# Patient Record
Sex: MALE | Race: WHITE | NOT HISPANIC OR LATINO
[De-identification: names, ages, dates, MRNs, and addresses within clinical notes are randomized per-mention and may not be internally consistent; named-entity substitution may affect disease eponyms.]

---

## 2021-11-19 PROBLEM — Z00.00 ENCOUNTER FOR PREVENTIVE HEALTH EXAMINATION: Status: ACTIVE | Noted: 2021-11-19

## 2021-11-22 PROBLEM — Z00.00 ENCOUNTER FOR PREVENTIVE HEALTH EXAMINATION: Noted: 2021-11-22

## 2021-11-29 ENCOUNTER — APPOINTMENT (OUTPATIENT)
Dept: PULMONOLOGY | Facility: CLINIC | Age: 76
End: 2021-11-29
Payer: MEDICARE

## 2021-11-29 VITALS
HEART RATE: 78 BPM | RESPIRATION RATE: 16 BRPM | OXYGEN SATURATION: 96 % | SYSTOLIC BLOOD PRESSURE: 136 MMHG | DIASTOLIC BLOOD PRESSURE: 74 MMHG | WEIGHT: 220 LBS

## 2021-11-29 DIAGNOSIS — Z87.891 PERSONAL HISTORY OF NICOTINE DEPENDENCE: ICD-10-CM

## 2021-11-29 DIAGNOSIS — R09.82 POSTNASAL DRIP: ICD-10-CM

## 2021-11-29 PROCEDURE — 99204 OFFICE O/P NEW MOD 45 MIN: CPT

## 2021-11-29 RX ORDER — DOXYCYCLINE HYCLATE 100 MG/1
100 CAPSULE ORAL
Qty: 14 | Refills: 0 | Status: DISCONTINUED | COMMUNITY
Start: 2021-10-04 | End: 2021-11-29

## 2021-11-29 RX ORDER — AZELASTINE HYDROCHLORIDE 137 UG/1
0.1 SPRAY, METERED NASAL
Qty: 30 | Refills: 0 | Status: ACTIVE | COMMUNITY
Start: 2021-10-07

## 2021-11-29 RX ORDER — FLUTICASONE PROPIONATE 50 UG/1
50 SPRAY, METERED NASAL TWICE DAILY
Qty: 1 | Refills: 6 | Status: ACTIVE | COMMUNITY
Start: 2021-11-29 | End: 1900-01-01

## 2021-11-29 RX ORDER — ALBUTEROL SULFATE 90 UG/1
108 (90 BASE) INHALANT RESPIRATORY (INHALATION)
Qty: 18 | Refills: 0 | Status: ACTIVE | COMMUNITY
Start: 2021-10-07
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
## 2021-12-01 ENCOUNTER — APPOINTMENT (OUTPATIENT)
Dept: CARDIOLOGY | Facility: CLINIC | Age: 76
End: 2021-12-01
Payer: MEDICARE

## 2021-12-01 ENCOUNTER — NON-APPOINTMENT (OUTPATIENT)
Age: 76
End: 2021-12-01

## 2021-12-01 VITALS
WEIGHT: 217 LBS | RESPIRATION RATE: 16 BRPM | HEART RATE: 60 BPM | BODY MASS INDEX: 32.89 KG/M2 | HEIGHT: 68 IN | SYSTOLIC BLOOD PRESSURE: 126 MMHG | OXYGEN SATURATION: 97 % | DIASTOLIC BLOOD PRESSURE: 70 MMHG

## 2021-12-01 DIAGNOSIS — K80.20 CALCULUS OF GALLBLADDER W/OUT CHOLECYSTITIS W/OUT OBSTRUCTION: ICD-10-CM

## 2021-12-01 DIAGNOSIS — Z01.811 ENCOUNTER FOR PREPROCEDURAL RESPIRATORY EXAMINATION: ICD-10-CM
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
## 2021-12-01 DIAGNOSIS — Z82.3 FAMILY HISTORY OF STROKE: ICD-10-CM

## 2021-12-01 DIAGNOSIS — Z23 ENCOUNTER FOR IMMUNIZATION: ICD-10-CM

## 2021-12-01 DIAGNOSIS — K46.9 UNSPECIFIED ABDOMINAL HERNIA W/OUT OBSTRUCTION OR GANGRENE: ICD-10-CM

## 2021-12-01 PROCEDURE — 93000 ELECTROCARDIOGRAM COMPLETE: CPT

## 2021-12-01 PROCEDURE — 99204 OFFICE O/P NEW MOD 45 MIN: CPT

## 2021-12-01 RX ORDER — FINASTERIDE 5 MG/1
5 TABLET, FILM COATED ORAL DAILY
Qty: 90 | Refills: 2 | Status: ACTIVE | COMMUNITY
Start: 2021-10-08

## 2021-12-01 RX ORDER — TRIAMCINOLONE ACETONIDE 1 MG/G
0.1 OINTMENT TOPICAL
Qty: 80 | Refills: 0 | Status: DISCONTINUED | COMMUNITY
Start: 2021-06-23 | End: 2021-12-01

## 2021-12-01 NOTE — DISCUSSION/SUMMARY
[FreeTextEntry1] : 1.  Check echocardiogram and nuclear stress test to evaluate his chest pain, shortness of breath on exertion and abnormal EKG.\par 2.  No additional cardiac medications at this time.  Will strongly consider treatment for hypertension at follow-up.\par 3.  Monitor BP at home, keep a log and bring to f/u.\par 4.  We will obtain the results of his recent vascular work-up.\par 5.  We will obtain his most recent blood work from his PCP.\par 6.  Follow-up with pulmonary.\par 7.  Follow-up with surgery.\par 8.  Follow up here after testing, and will make further recommendations at that time.

## 2021-12-01 NOTE — HISTORY OF PRESENT ILLNESS
[FreeTextEntry1] : Patient presents to the office today for evaluation because recently is been having primarily issues with chest pain and shortness of breath.  He says about a month and a half ago he started having issues with wheezing at night and a runny nose.  He saw ENT and was told he had postnasal drip and was started on nasal spray.  He also follow-up with pulmonary who is continuing the nasal spray and planning PFTs.  Over the last few weeks he has been having issues also with chest pain and shortness of breath and some dyspnea on exertion.  He is not very specific as to the chest discomfort but says it is on the left side of his chest occurring with and without exertion.  It is associated with the shortness of breath.  He feels as though the symptoms are slowly improving at this point as he has been on the nasal spray.  He is still having some wheezing at night.  He also had been having a little bit of edema and saw a vascular doctor who did a work-up but he does not know the results.  When all of this started he was seen in urgent care and then the emergency room where he had a CAT scan which showed no evidence of PE or any intrathoracic process.  He was found to have a porcelain gallbladder and there is consideration for surgery for that in the near future.  Patient denies palpitations, orthopnea, presyncope, syncope.
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
## 2021-12-14 ENCOUNTER — APPOINTMENT (OUTPATIENT)
Dept: PULMONOLOGY | Facility: CLINIC | Age: 76
End: 2021-12-14

## 2021-12-15 ENCOUNTER — APPOINTMENT (OUTPATIENT)
Dept: CARDIOLOGY | Facility: CLINIC | Age: 76
End: 2021-12-15
Payer: MEDICARE

## 2021-12-15 ENCOUNTER — TRANSCRIPTION ENCOUNTER (OUTPATIENT)
Age: 76
End: 2021-12-15

## 2021-12-15 PROCEDURE — 93306 TTE W/DOPPLER COMPLETE: CPT

## 2022-01-03 ENCOUNTER — APPOINTMENT (OUTPATIENT)
Dept: PULMONOLOGY | Facility: CLINIC | Age: 77
End: 2022-01-03

## 2022-01-21 ENCOUNTER — APPOINTMENT (OUTPATIENT)
Dept: CARDIOLOGY | Facility: CLINIC | Age: 77
End: 2022-01-21

## 2022-01-31 ENCOUNTER — MED ADMIN CHARGE (OUTPATIENT)
Age: 77
End: 2022-01-31

## 2022-01-31 ENCOUNTER — APPOINTMENT (OUTPATIENT)
Dept: CARDIOLOGY | Facility: CLINIC | Age: 77
End: 2022-01-31
Payer: MEDICARE

## 2022-01-31 PROCEDURE — 93015 CV STRESS TEST SUPVJ I&R: CPT

## 2022-01-31 PROCEDURE — A9500: CPT

## 2022-01-31 PROCEDURE — 78452 HT MUSCLE IMAGE SPECT MULT: CPT

## 2022-01-31 RX ADMIN — REGADENOSON 0 MG/5ML: 0.08 INJECTION, SOLUTION INTRAVENOUS at 00:00

## 2022-01-31 RX ADMIN — AMINOPHYLLINE 3 MG/ML: 25 INJECTION, SOLUTION INTRAVENOUS at 00:00

## 2022-02-03 ENCOUNTER — NON-APPOINTMENT (OUTPATIENT)
Age: 77
End: 2022-02-03

## 2022-02-03 ENCOUNTER — APPOINTMENT (OUTPATIENT)
Dept: CARDIOLOGY | Facility: CLINIC | Age: 77
End: 2022-02-03
Payer: MEDICARE

## 2022-02-03 VITALS
BODY MASS INDEX: 33.49 KG/M2 | SYSTOLIC BLOOD PRESSURE: 140 MMHG | WEIGHT: 221 LBS | HEIGHT: 68 IN | RESPIRATION RATE: 14 BRPM | OXYGEN SATURATION: 96 % | DIASTOLIC BLOOD PRESSURE: 82 MMHG | HEART RATE: 67 BPM

## 2022-02-03 DIAGNOSIS — R07.9 CHEST PAIN, UNSPECIFIED: ICD-10-CM

## 2022-02-03 PROCEDURE — 99214 OFFICE O/P EST MOD 30 MIN: CPT

## 2022-02-03 PROCEDURE — 93000 ELECTROCARDIOGRAM COMPLETE: CPT

## 2022-02-03 RX ORDER — AMINOPHYLLINE 25 MG/ML
25 INJECTION, SOLUTION INTRAVENOUS
Qty: 0 | Refills: 0 | Status: COMPLETED | OUTPATIENT
Start: 2022-01-31

## 2022-02-03 RX ORDER — FLUTICASONE FUROATE AND VILANTEROL TRIFENATATE 100; 25 UG/1; UG/1
100-25 POWDER RESPIRATORY (INHALATION) DAILY
Refills: 0 | Status: ACTIVE | COMMUNITY

## 2022-02-03 RX ORDER — REGADENOSON 0.08 MG/ML
0.4 INJECTION, SOLUTION INTRAVENOUS
Qty: 4 | Refills: 0 | Status: COMPLETED | OUTPATIENT
Start: 2022-01-31

## 2022-02-03 NOTE — DISCUSSION/SUMMARY
[FreeTextEntry1] : 1.  No additional cardiac testing at this time.\par 2.  Start telmisartan 20 mg daily for hypertension.  Repeat blood work in 2 weeks.  \par 3.  Monitor BP at home, keep a log and bring to f/u.\par 4.  We will obtain the results of his recent vascular work-up.\par 5.  We will obtain his most recent blood work from his PCP.\par 6.  Follow-up with pulmonary.\par 7.  Follow-up with surgery.\par 8.  Follow up here in three months.

## 2022-02-03 NOTE — PHYSICAL EXAM
[Well Developed] : well developed [Well Nourished] : well nourished [No Acute Distress] : no acute distress [Normal Conjunctiva] : normal conjunctiva [Normal Venous Pressure] : normal venous pressure [No Carotid Bruit] : no carotid bruit [Normal S1, S2] : normal S1, S2 [No Rub] : no rub [No Gallop] : no gallop [Clear Lung Fields] : clear lung fields [Good Air Entry] : good air entry [No Respiratory Distress] : no respiratory distress  [Soft] : abdomen soft [Non Tender] : non-tender [No Masses/organomegaly] : no masses/organomegaly [Normal Bowel Sounds] : normal bowel sounds [Normal Gait] : normal gait [No Cyanosis] : no cyanosis [No Clubbing] : no clubbing [No Varicosities] : no varicosities [Moves all extremities] : moves all extremities [No Focal Deficits] : no focal deficits [Normal Speech] : normal speech [Alert and Oriented] : alert and oriented [Normal memory] : normal memory [de-identified] : +2/6 RADHA [de-identified] : Diminished on right [de-identified] : Tr b/l pretibial edema

## 2022-02-03 NOTE — HISTORY OF PRESENT ILLNESS
[FreeTextEntry1] : Patient returns today feeling better than last I saw him.  His chest pain and shortness of breath have improved significantly with time and the use of Breo.  Blood pressures have been in the 130s to 140s over 70s when he checks them at home but he did not bring a list of his machine with him here today.  He reports no other new symptoms at this time.  Patient denies palpitations, orthopnea, presyncope, syncope.

## 2022-05-12 RX ORDER — KIT FOR THE PREPARATION OF TECHNETIUM TC99M SESTAMIBI 1 MG/5ML
INJECTION, POWDER, LYOPHILIZED, FOR SOLUTION PARENTERAL
Refills: 0 | Status: COMPLETED | OUTPATIENT
Start: 2022-05-12

## 2022-05-12 RX ADMIN — KIT FOR THE PREPARATION OF TECHNETIUM TC99M SESTAMIBI 0: 1 INJECTION, POWDER, LYOPHILIZED, FOR SOLUTION PARENTERAL at 00:00

## 2022-06-02 ENCOUNTER — APPOINTMENT (OUTPATIENT)
Dept: CARDIOLOGY | Facility: CLINIC | Age: 77
End: 2022-06-02
Payer: MEDICARE

## 2022-06-02 ENCOUNTER — NON-APPOINTMENT (OUTPATIENT)
Age: 77
End: 2022-06-02

## 2022-06-02 VITALS
BODY MASS INDEX: 33.04 KG/M2 | RESPIRATION RATE: 16 BRPM | HEIGHT: 68 IN | HEART RATE: 60 BPM | WEIGHT: 218 LBS | DIASTOLIC BLOOD PRESSURE: 79 MMHG | SYSTOLIC BLOOD PRESSURE: 143 MMHG | OXYGEN SATURATION: 96 %

## 2022-06-02 PROCEDURE — 99213 OFFICE O/P EST LOW 20 MIN: CPT

## 2022-06-02 PROCEDURE — 93000 ELECTROCARDIOGRAM COMPLETE: CPT

## 2022-06-02 NOTE — HISTORY OF PRESENT ILLNESS
[FreeTextEntry1] : Patient presents back to the office today feeling reasonably well.  He tolerated the addition of telmisartan without a problem.  He reports blood pressure still in the 130s to low 140s over 70s.  Shortness of breath has essentially not changed at all.  He still has some with exertion but it has been very stable.  Patient denies chest pain, palpitations, orthopnea, presyncope, syncope.

## 2022-06-02 NOTE — DISCUSSION/SUMMARY
[FreeTextEntry1] : 1.  No additional cardiac testing at this time.\par 2.  Continue telmisartan 20 mg daily for hypertension.  Repeat blood work with his primary.\par 3.  Monitor BP at home, keep a log and bring to f/u.\par 4.  We will obtain the results of his recent vascular work-up.\par 5.  Follow-up with pulmonary.\par 6.  Follow up here in three months.

## 2022-06-02 NOTE — PHYSICAL EXAM
[Well Developed] : well developed [Well Nourished] : well nourished [No Acute Distress] : no acute distress [Normal Conjunctiva] : normal conjunctiva [Normal Venous Pressure] : normal venous pressure [No Carotid Bruit] : no carotid bruit [Normal S1, S2] : normal S1, S2 [No Rub] : no rub [No Gallop] : no gallop [Clear Lung Fields] : clear lung fields [Good Air Entry] : good air entry [No Respiratory Distress] : no respiratory distress  [Soft] : abdomen soft [Non Tender] : non-tender [No Masses/organomegaly] : no masses/organomegaly [Normal Bowel Sounds] : normal bowel sounds [Normal Gait] : normal gait [No Cyanosis] : no cyanosis [No Clubbing] : no clubbing [No Varicosities] : no varicosities [Moves all extremities] : moves all extremities [No Focal Deficits] : no focal deficits [Normal Speech] : normal speech [Alert and Oriented] : alert and oriented [Normal memory] : normal memory [de-identified] : +2/6 RADHA [de-identified] : Diminished on right [de-identified] : Tr b/l pretibial edema
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
## 2022-11-22 ENCOUNTER — NON-APPOINTMENT (OUTPATIENT)
Age: 77
End: 2022-11-22

## 2022-11-22 ENCOUNTER — APPOINTMENT (OUTPATIENT)
Dept: CARDIOLOGY | Facility: CLINIC | Age: 77
End: 2022-11-22

## 2022-11-22 VITALS
HEIGHT: 68 IN | SYSTOLIC BLOOD PRESSURE: 108 MMHG | RESPIRATION RATE: 16 BRPM | OXYGEN SATURATION: 96 % | BODY MASS INDEX: 34.4 KG/M2 | HEART RATE: 57 BPM | DIASTOLIC BLOOD PRESSURE: 70 MMHG | WEIGHT: 227 LBS

## 2022-11-22 VITALS — DIASTOLIC BLOOD PRESSURE: 74 MMHG | SYSTOLIC BLOOD PRESSURE: 122 MMHG

## 2022-11-22 DIAGNOSIS — R06.02 SHORTNESS OF BREATH: ICD-10-CM

## 2022-11-22 DIAGNOSIS — I10 ESSENTIAL (PRIMARY) HYPERTENSION: ICD-10-CM

## 2022-11-22 PROCEDURE — 99213 OFFICE O/P EST LOW 20 MIN: CPT

## 2022-11-22 PROCEDURE — 93000 ELECTROCARDIOGRAM COMPLETE: CPT

## 2022-11-22 RX ORDER — TELMISARTAN 40 MG/1
40 TABLET ORAL DAILY
Qty: 90 | Refills: 1 | Status: ACTIVE | COMMUNITY
Start: 2022-02-03 | End: 1900-01-01

## 2022-11-22 NOTE — DISCUSSION/SUMMARY
[FreeTextEntry1] : 1.  No additional cardiac testing at this time.\par 2.  Increase telmisartan to 40 mg daily for hypertension..  He will get me a copy of his BW from his PCP.\par 3.  Monitor BP at home, keep a log and bring to f/u.\par 4.  He will also get me results of his vascular work-up.\par 5.  Follow-up with pulmonary.\par 6.  Follow up here in three months. [EKG obtained to assist in diagnosis and management of assessed problem(s)] : EKG obtained to assist in diagnosis and management of assessed problem(s)
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
## 2022-11-22 NOTE — HISTORY OF PRESENT ILLNESS
[FreeTextEntry1] : Patient presents back to the office today noting that his shortness of breath has now resolved.  He saw an urgent care center and was given a course of antibiotics and after taking that his breathing improved substantially.  He really reports no shortness of breath with exertion or at any other time at this point.  He reports blood pressures at home have been in the 130s to 140s over 70s to 80s.  No other new symptoms at this time.  Patient denies chest pain, palpitations, orthopnea, presyncope, syncope.

## 2022-11-22 NOTE — PHYSICAL EXAM
[Well Developed] : well developed [Well Nourished] : well nourished [No Acute Distress] : no acute distress [Normal Conjunctiva] : normal conjunctiva [Normal Venous Pressure] : normal venous pressure [No Carotid Bruit] : no carotid bruit [Normal S1, S2] : normal S1, S2 [No Rub] : no rub [No Gallop] : no gallop [Clear Lung Fields] : clear lung fields [Good Air Entry] : good air entry [No Respiratory Distress] : no respiratory distress  [Soft] : abdomen soft [Non Tender] : non-tender [No Masses/organomegaly] : no masses/organomegaly [Normal Bowel Sounds] : normal bowel sounds [Normal Gait] : normal gait [No Cyanosis] : no cyanosis [No Clubbing] : no clubbing [No Varicosities] : no varicosities [Moves all extremities] : moves all extremities [No Focal Deficits] : no focal deficits [Normal Speech] : normal speech [Alert and Oriented] : alert and oriented [Normal memory] : normal memory [de-identified] : +2/6 RADHA [de-identified] : Diminished on right [de-identified] : Tr b/l pretibial edema

## 2023-08-22 ENCOUNTER — OFFICE (OUTPATIENT)
Dept: URBAN - METROPOLITAN AREA CLINIC 6 | Facility: CLINIC | Age: 78
Setting detail: OPHTHALMOLOGY
End: 2023-08-22
Payer: MEDICARE

## 2023-08-22 DIAGNOSIS — H35.371: ICD-10-CM

## 2023-08-22 DIAGNOSIS — H25.13: ICD-10-CM

## 2023-08-22 DIAGNOSIS — H40.013: ICD-10-CM

## 2023-08-22 DIAGNOSIS — H01.004: ICD-10-CM

## 2023-08-22 DIAGNOSIS — H01.002: ICD-10-CM

## 2023-08-22 DIAGNOSIS — H52.4: ICD-10-CM

## 2023-08-22 DIAGNOSIS — H01.001: ICD-10-CM

## 2023-08-22 DIAGNOSIS — H01.005: ICD-10-CM

## 2023-08-22 PROCEDURE — 92083 EXTENDED VISUAL FIELD XM: CPT | Performed by: OPHTHALMOLOGY

## 2023-08-22 PROCEDURE — 92015 DETERMINE REFRACTIVE STATE: CPT | Performed by: OPHTHALMOLOGY

## 2023-08-22 PROCEDURE — 76514 ECHO EXAM OF EYE THICKNESS: CPT | Performed by: OPHTHALMOLOGY

## 2023-08-22 PROCEDURE — 92250 FUNDUS PHOTOGRAPHY W/I&R: CPT | Performed by: OPHTHALMOLOGY

## 2023-08-22 PROCEDURE — 92004 COMPRE OPH EXAM NEW PT 1/>: CPT | Performed by: OPHTHALMOLOGY

## 2023-08-22 ASSESSMENT — KERATOMETRY
OD_K1POWER_DIOPTERS: 42.75
OS_K1POWER_DIOPTERS: 42.50
OS_AXISANGLE_DEGREES: 162
OS_K2POWER_DIOPTERS: 43.50
OD_K2POWER_DIOPTERS: 44.25
OD_AXISANGLE_DEGREES: 010

## 2023-08-22 ASSESSMENT — REFRACTION_CURRENTRX
OD_OVR_VA: 20/
OS_AXIS: 081
OD_AXIS: 086
OD_SPHERE: +3.25
OD_CYLINDER: -1.75
OD_ADD: +2.75
OS_CYLINDER: -1.25
OS_OVR_VA: 20/
OS_SPHERE: +2.50
OS_ADD: +2.75

## 2023-08-22 ASSESSMENT — AXIALLENGTH_DERIVED
OD_AL: 22.7015
OD_AL: 22.7015
OS_AL: 22.9187
OS_AL: 22.9187

## 2023-08-22 ASSESSMENT — REFRACTION_MANIFEST
OD_ADD: +2.50
OU_VA: 20/20-
OS_CYLINDER: -1.50
OD_AXIS: 095
OD_SPHERE: +3.25
OS_AXIS: 080
OS_VA1: 20/20-2
OD_CYLINDER: -1.75
OS_ADD: +2.50
OD_VA1: 20/30-2
OS_SPHERE: +3.00

## 2023-08-22 ASSESSMENT — PACHYMETRY
OD_CT_UM: 565
OS_CT_CORRECTION: -1
OD_CT_CORRECTION: -1
OS_CT_UM: 566

## 2023-08-22 ASSESSMENT — REFRACTION_AUTOREFRACTION
OD_CYLINDER: -1.75
OS_SPHERE: +3.00
OD_AXIS: 095
OS_AXIS: 078
OD_SPHERE: +3.25
OS_CYLINDER: -1.50

## 2023-08-22 ASSESSMENT — VISUAL ACUITY
OD_BCVA: 20/20-2
OS_BCVA: 20/30-2

## 2023-08-22 ASSESSMENT — SPHEQUIV_DERIVED
OD_SPHEQUIV: 2.375
OD_SPHEQUIV: 2.375
OS_SPHEQUIV: 2.25
OS_SPHEQUIV: 2.25

## 2023-08-22 ASSESSMENT — LID EXAM ASSESSMENTS
OD_BLEPHARITIS: RLL RUL
OS_BLEPHARITIS: LLL LUL

## 2023-08-22 ASSESSMENT — TONOMETRY
OS_IOP_MMHG: 20
OD_IOP_MMHG: 17

## 2023-08-22 ASSESSMENT — CONFRONTATIONAL VISUAL FIELD TEST (CVF)
OS_FINDINGS: FULL
OD_FINDINGS: FULL
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
## 2024-02-05 ENCOUNTER — APPOINTMENT (OUTPATIENT)
Dept: CARDIOLOGY | Facility: CLINIC | Age: 79
End: 2024-02-05